# Patient Record
Sex: MALE
[De-identification: names, ages, dates, MRNs, and addresses within clinical notes are randomized per-mention and may not be internally consistent; named-entity substitution may affect disease eponyms.]

---

## 2018-04-05 NOTE — ER
Nurse's Notes                                                                                     

 [facility]                                                                

Name: James Leal Jr                                                                          

Age: 62 yrs                                                                                       

Sex: Male                                                                                         

: 1955                                                                                   

MRN: [id number]                                                                                   

Arrival Date: 2018                                                                          

Time: 03:50                                                                                       

Account#: [id number]                                                                            

Bed 20                                                                                            

Private MD: Trip Bo                                                                         

Diagnosis: Abdominal tenderness                                                                   

                                                                                                  

Presentation:                                                                                     

                                                                                             

04:13 Presenting complaint: Patient states: "I have been having lower abdominal, back , and   jd3 

      neck pains and I can't figure out what it is". Transition of care: patient was not          

      received from another setting of care. Onset of symptoms was 2018. Care prior     

      to arrival: None.                                                                           

04:13 Method Of Arrival: Ambulatory                                                           jd3 

04:13 Acuity: DUY 3                                                                           jd3 

                                                                                                  

Historical:                                                                                       

- Allergies:                                                                                      

04:19 PENICILLINS;                                                                            jd3 

- Home Meds:                                                                                      

04:19 carvedilol 12.5 mg oral tab 1 tab 2 times per day [Active]; lisinopril 20 mg Oral tab 1 jd3 

      tab once daily [Active]; simvastatin 20 mg Oral tab 1 tab once daily [Active];              

- PMHx:                                                                                           

04:19 Hypertension; High Cholesterol;                                                         jd3 

- PSHx:                                                                                           

04:19 left foot surgery;                                                                      jd3 

                                                                                                  

- Immunization history:: Adult Immunizations up to date.                                          

- Family history:: not pertinent.                                                                 

- Social history:: Smoking status: Patient uses tobacco products, smokes one pack                 

  cigarettes per day.                                                                             

- Hospitalizations: : No recent hospitalization is reported.                                      

                                                                                                  

                                                                                                  

Screenin:16 Abuse screen: Denies threats or abuse. Nutritional screening: No deficits noted.        jd3 

      Tuberculosis screening: No symptoms or risk factors identified. Fall Risk None              

      identified.                                                                                 

                                                                                                  

Assessment:                                                                                       

04:22 General: Appears in no apparent distress. comfortable, Behavior is calm, cooperative,   jd3 

      appropriate for age. Pain: Denies pain. Neuro: Level of Consciousness is awake, alert,      

      obeys commands, Oriented to person, place, time, situation. Cardiovascular: Heart tones     

      S1 S2 present Capillary refill < 3 seconds Patient's skin is warm and dry. Respiratory:     

      Airway Respiratory effort is even, unlabored, Respiratory pattern is regular,               

      symmetrical, Breath sounds are clear bilaterally. GI: Abdomen is round Bowel sounds         

      present X 4 quads. Abd is soft and non tender X 4 quads. Patient currently denies           

      nausea, vomiting. : No signs and/or symptoms were reported regarding the                  

      genitourinary system. EENT: No signs and/or symptoms were reported regarding the EENT       

      system. Derm: Skin is intact, Skin is dry, Skin is normal, Skin temperature is warm.        

      Musculoskeletal: Circulation, motion, and sensation intact. Range of motion: intact in      

      all extremities.                                                                            

04:26 Reassessment: CT notified of pt finishing oral contrast.                                jd3 

04:48 Reassessment: Patient appears in no apparent distress at this time. Patient and/or      jd3 

      family updated on plan of care and expected duration. Pain level reassessed. Patient is     

      alert, oriented x 3, equal unlabored respirations, skin warm/dry/pink.                      

05:05 Reassessment: pt reporting pain, provider notified, new orders received, see MAR.       jd3 

05:45 Reassessment: Patient appears in no apparent distress at this time. Patient and/or      jd3 

      family updated on plan of care and expected duration. Pain level reassessed. Patient is     

      alert, oriented x 3, equal unlabored respirations, skin warm/dry/pink.                      

06:00 Reassessment: pt reporting continuing pain. provider notified, new orders received see  jd3 

      MAR.                                                                                        

06:43 Reassessment: Patient appears in no apparent distress at this time. Patient and/or      jd3 

      family updated on plan of care and expected duration. Pain level reassessed. Patient is     

      alert, oriented x 3, equal unlabored respirations, skin warm/dry/pink.                      

07:05 Reassessment: Patient appears in no apparent distress at this time. Patient and/or      ch  

      family updated on plan of care and expected duration. Pain level reassessed. pt is          

      sleeping in room, resps even and unlabored, no s/s of distress. awaiting ct results.        

08:15 Reassessment: Patient appears in no apparent distress at this time. Patient and/or      ch  

      family updated on plan of care and expected duration. Pain level reassessed. Patient is     

      alert, oriented x 3, equal unlabored respirations, skin warm/dry/pink. Patient states       

      feeling better. Patient states symptoms have improved.                                      

                                                                                                  

Vital Signs:                                                                                      

04:15  / 139; Pulse 62; Resp 18 S; Temp 98.0(O); Pulse Ox 99% on R/A; Weight 100.24 kg  jd3 

      (R); Height 5 ft. 10 in. (177.80 cm) (R); Pain 0/10;                                        

04:48  / 76; Pulse 55; Resp 18 S; Pulse Ox 98% on R/A; Pain 0/10;                       jd3 

05:45  / 82; Pulse 59; Resp 17 S; Pulse Ox 96% on R/A; Pain 6/10;                       jd3 

06:43  / 75; Pulse 56; Resp 17 S; Pulse Ox 96% on R/A;                                  jd3 

08:15  / 74; Pulse 62; Resp 15; Temp 97.9; Pulse Ox 99% on R/A; Pain 6/10;              ch  

04:15 Body Mass Index 31.71 (100.24 kg, 177.80 cm)                                            jd3 

                                                                                                  

ED Course:                                                                                        

03:50 Patient arrived in ED.                                                                  am2 

03:50 Trip Bo MD is Private Physician.                                                 am2 

04:00 Gabriele Ferrer MD is Attending Physician.                                                rn  

04:12 Shun Russell RN is Primary Nurse.                                                  jd3 

04:15 Triage completed.                                                                       jd3 

04:16 Arm band placed on.                                                                     jd3 

04:17 Patient has correct armband on for positive identification.                             jd3 

06:19 CT Abd/Pelvis - W/Contrast In Process Unspecified.                                      EDMS

06:58 Report given to Darline DOMINGUEZ.                                                           jd3 

07:00 Inserted saline lock: 20 gauge in right antecubital area, using aseptic technique.      ch  

      Blood collected. inserted pta.                                                              

07:05 No apparent distress. Resting quietly.                                                  ch  

07:05 Pulse ox on. NIBP on.                                                                   ch  

07:05 No provider procedures requiring assistance completed.                                  ch  

07:10 Primary Nurse role handed off by Shun Russell RN                                   ch  

07:10 Darline Ly, RN is Primary Nurse.                                                ch  

07:52 Trip Bo MD is Referral Physician.                                                gwen 

08:15 IV discontinued, intact, bleeding controlled, No redness/swelling at site. Pressure     ch  

      dressing applied.                                                                           

                                                                                                  

Administered Medications:                                                                         

05:07 Drug: Demerol - Meperidine 12.5 mg Route: IVP; Site: right antecubital;                 jd3 

06:03 Follow up: Response: No adverse reaction; Pain is unchanged, physician notified         jd3 

06:02 Drug: Demerol - Meperidine 12.5 mg Route: IVP; Site: right antecubital;                 jd3 

07:09 Follow up: Response: No adverse reaction; Marked relief of symptoms                       

                                                                                                  

                                                                                                  

Outcome:                                                                                          

07:00 Discharged to home ambulatory, with family.                                               

07:00 Condition: stable                                                                           

07:00 Discharge instructions given to patient, family, Instructed on discharge instructions,      

      follow up and referral plans. medication usage, Demonstrated understanding of               

      instructions, follow-up care, medications, Prescriptions given X 3.                         

07:54 Discharge ordered by MD. hidalgo 

08:19 Patient left the ED.                                                                      

                                                                                                  

Signatures:                                                                                       

Dispatcher [employer]                           EDDarline Villalpando, RN                  Garo Quinteros ch, MD MD cha Nieto, Roman, MD MD rn Moreno, Amanda am2 Davies, Jonathon, RN                    RN   jd3                                                  

                                                                                                  

Corrections: (The following items were deleted from the chart)                                    

06:05 05:45  / 82; Pulse 59bpm; Resp 17bpm; Spontaneous; Pulse Ox 96% RA; jd3           jd3 

06:59 06:58 Report given to Darline DOMINGUEZ jd3                                                  jd3 

                                                                                                  

**************************************************************************************************

## 2018-04-05 NOTE — RAD REPORT
EXAM DESCRIPTION:  CT - Abdomen   Pelvis W Contrast - 4/5/2018 6:19 am

 

CLINICAL HISTORY:   Abdominal pain. Lower abdominal pain

 

COMPARISON:  None.

 

TECHNIQUE:  Computed axial tomography of the abdomen and pelvis was obtained. 100 cc Isovue-300 is ad
ministered intravenously. Oral contrast was given. A preliminary report was generated by [facility] and reviewed prior to this dictation

 

All CT scans are performed using dose optimization technique as appropriate and may include automated
 exposure control or mA/KV adjustment according to patient size.

 

FINDINGS:

The liver, spleen, pancreas, adrenals and kidneys appear unremarkable.

 

The appendix is normal caliber. There is no evidence of diverticulitis.

 

Small inguinal hernias contain fat

 

 

IMPRESSION:   No acute abnormality is displayed

## 2018-04-05 NOTE — EDPHYS
Physician Documentation                                                                           

 [facility]                                                                

Name: James Leal Jr                                                                          

Age: 62 yrs                                                                                       

Sex: Male                                                                                         

: 1955                                                                                   

MRN: [id number]                                                                                   

Arrival Date: 2018                                                                          

Time: 03:50                                                                                       

Account#: [id number]                                                                            

Bed 20                                                                                            

Private MD: Trip Bo                                                                         

ED Physician Gabriele Ferrer                                                                         

HPI:                                                                                              

                                                                                             

04:13 This 62 yrs old  Male presents to ER via Unassigned with complaints of          rn  

      Abdominal Pain.                                                                             

04:14 The patient presents with abdominal pain in the lower abdomen. Onset: The               rn  

      symptoms/episode began/occurred yesterday. The symptoms do not radiate. Associated          

      signs and symptoms: Pertinent positives: constipation, Pertinent negatives: anorexia,       

      blood in stools, diarrhea, dysuria, fever, shortness of breath, testicular pain,            

      vomiting, vomiting blood. The symptoms are described as achy, crampy. Modifying             

      factors: The symptoms are alleviated by nothing, the symptoms are aggravated by             

      nothing. Severity of pain: At its worst the pain was mild in the emergency department       

      the pain is unchanged. The patient has not experienced similar symptoms in the past.        

      Reports lower abd pain that began last night, intermittent, tried to go to bathroom and     

      couldn't, no fever, no trauma, never happened before, started shortly after taking          

      zquil.                                                                                      

                                                                                                  

Historical:                                                                                       

- Allergies:                                                                                      

04:19 PENICILLINS;                                                                            jd3 

- Home Meds:                                                                                      

04:19 carvedilol 12.5 mg oral tab 1 tab 2 times per day [Active]; lisinopril 20 mg Oral tab 1 jd3 

      tab once daily [Active]; simvastatin 20 mg Oral tab 1 tab once daily [Active];              

- PMHx:                                                                                           

04:19 Hypertension; High Cholesterol;                                                         jd3 

- PSHx:                                                                                           

04:19 left foot surgery;                                                                      jd3 

                                                                                                  

- Immunization history:: Adult Immunizations up to date.                                          

- Family history:: not pertinent.                                                                 

- Social history:: Smoking status: Patient uses tobacco products, smokes one pack                 

  cigarettes per day.                                                                             

- Hospitalizations: : No recent hospitalization is reported.                                      

                                                                                                  

                                                                                                  

ROS:                                                                                              

04:14 Constitutional: Negative for fever, chills, and weight loss, Eyes: Negative for injury, rn  

      pain, redness, and discharge, Cardiovascular: Negative for chest pain, palpitations,        

      and edema, Respiratory: Negative for shortness of breath, cough, wheezing, and              

      pleuritic chest pain, Abdomen/GI: Negative for nausea, vomiting, diarrhea Back:             

      Negative for injury and pain, MS/Extremity: Negative for injury and deformity, Skin:        

      Negative for injury, rash, and discoloration, Neuro: Negative for headache, weakness,       

      numbness, tingling, and seizure.                                                            

                                                                                                  

Exam:                                                                                             

04:14 Constitutional:  This is a well developed, well nourished patient who is awake, alert,  rn  

      and in no acute distress. Head/Face:  Normocephalic, atraumatic. Eyes:  Pupils equal        

      round and reactive to light, extra-ocular motions intact.  Lids and lashes normal.          

      Conjunctiva and sclera are non-icteric and not injected.  Cornea within normal limits.      

      Periorbital areas with no swelling, redness, or edema. Neck:  Trachea midline, no           

      thyromegaly or masses palpated, and no cervical lymphadenopathy.  Supple, full range of     

      motion without nuchal rigidity, or vertebral point tenderness.  No Meningismus.             

      Cardiovascular:  Regular rate and rhythm with a normal S1 and S2.  No gallops, murmurs,     

      or rubs.  Normal PMI, no JVD.  No pulse deficits. Respiratory:  Lungs have equal breath     

      sounds bilaterally, clear to auscultation and percussion.  No rales, rhonchi or wheezes     

      noted.  No increased work of breathing, no retractions or nasal flaring. Abdomen/GI:        

      Soft, non-tender, with normal bowel sounds.  No distension or tympany.  No guarding or      

      rebound.  No evidence of tenderness throughout. MS/ Extremity:  Pulses equal, no            

      cyanosis.  Neurovascular intact.  Full, normal range of motion.  Equal circumference.       

      Neuro:  Awake and alert, GCS 15, oriented to person, place, time, and situation.            

      Cranial nerves II-XII grossly intact.  Motor strength 5/5 in all extremities.  Sensory      

      grossly intact.  Cerebellar exam normal.  Normal gait.                                      

                                                                                                  

Vital Signs:                                                                                      

04:15  / 139; Pulse 62; Resp 18 S; Temp 98.0(O); Pulse Ox 99% on R/A; Weight 100.24 kg  jd3 

      (R); Height 5 ft. 10 in. (177.80 cm) (R); Pain 0/10;                                        

04:48  / 76; Pulse 55; Resp 18 S; Pulse Ox 98% on R/A; Pain 0/10;                       jd3 

05:45  / 82; Pulse 59; Resp 17 S; Pulse Ox 96% on R/A; Pain 6/10;                       jd3 

06:43  / 75; Pulse 56; Resp 17 S; Pulse Ox 96% on R/A;                                  jd3 

08:15  / 74; Pulse 62; Resp 15; Temp 97.9; Pulse Ox 99% on R/A; Pain 6/10;              ch  

04:15 Body Mass Index 31.71 (100.24 kg, 177.80 cm)                                            jd3 

                                                                                                  

MDM:                                                                                              

04:00 Patient medically screened.                                                             rn  

07:57 Data reviewed: vital signs, nurses notes, lab test result(s), EKG, radiologic studies,  [facility] 

      CT scan, plain films.                                                                       

                                                                                                  

                                                                                             

04:13 Order name: Basic Metabolic Panel; Complete Time: 06:12                                 rn  

                                                                                             

04:13 Order name: CBC with Diff; Complete Time: 04:49                                         rn  

                                                                                             

04:13 Order name: Creatinine for Radiology; Complete Time: 06:12                              rn  

                                                                                             

04:13 Order name: Hepatic Function; Complete Time: 06:12                                      rn  

                                                                                             

04:13 Order name: Lipase; Complete Time: 06:12                                                rn  

                                                                                             

04:13 Order name: Urine Microscopic Only; Complete Time: 06:12                                rn  

                                                                                             

04:13 Order name: IV Saline Lock; Complete Time: 04:24                                        rn  

                                                                                             

04:13 Order name: CT Abd/Pelvis - W/Contrast                                                  rn  

                                                                                             

04:13 Order name: Flu; Complete Time: 06:12                                                   rn  

                                                                                             

04:13 Order name: CK; Complete Time: 06:12                                                    rn  

                                                                                             

05:08 Order name: Urine Dipstick--Ancillary (enter results); Complete Time: 06:12             rg2 

                                                                                             

04:13 Order name: Labs collected and sent; Complete Time: 04:24                               rn  

                                                                                             

04:13 Order name: Urine Dipstick-Ancillary (obtain specimen); Complete Time: 05:07            rn  

                                                                                             

07:57 Order name: EKG - Nurse/Tech; Complete Time: 08:05                                      [facility] 

                                                                                                  

Administered Medications:                                                                         

05:07 Drug: Demerol - Meperidine 12.5 mg Route: IVP; Site: right antecubital;                 jd3 

06:03 Follow up: Response: No adverse reaction; Pain is unchanged, physician notified         jd3 

06:02 Drug: Demerol - Meperidine 12.5 mg Route: IVP; Site: right antecubital;                 jd3 

07:09 Follow up: Response: No adverse reaction; Marked relief of symptoms                       

                                                                                                  

                                                                                                  

Disposition:                                                                                      

18 07:54 Discharged to Home. Impression: Abdominal tenderness.                              

- Condition is Stable.                                                                            

- Discharge Instructions: Abdominal Pain, Adult, Abdominal Pain, Adult, Easy-to-Read.             

- Prescriptions for Bentyl 20 mg Oral Tablet - take 1 tablet by ORAL route every 6                

  hours As needed; 20 tablet. Pepcid 20 mg Oral Tablet - take 1 tablet by ORAL route              

  every 12 hours for 10 days; 20 tablet. Zofran 4 mg Oral Tablet - take 1 tablet by               

  ORAL route every 12 hours As needed; 20 tablet.                                                 

- Work release form, Family Work Release, Medication Reconciliation Form, Thank You               

  Letter, Antibiotic Education, Prescription Opioid Use form.                                     

- Follow up: Trip Bo MD; When: 1 - 2 days; Reason: Recheck today's complaints,             

  Continuance of care, Re-evaluation by your physician.                                           

- Problem is new.                                                                                 

- Symptoms have improved.                                                                         

                                                                                                  

                                                                                                  

                                                                                                  

Signatures:                                                                                       

Dispatcher [employer]                           Darline Vera, RN                  RN   Garo Leavitt MD MD cha Nieto, Roman, MD MD rn Davies, Jonathon, RN                    RN   jd3                                                  

                                                                                                  

**************************************************************************************************

## 2019-01-07 ENCOUNTER — HOSPITAL ENCOUNTER (EMERGENCY)
Dept: HOSPITAL 97 - ER | Age: 64
Discharge: LEFT BEFORE DISCHARGE | End: 2019-01-07
Payer: COMMERCIAL

## 2019-01-07 VITALS — DIASTOLIC BLOOD PRESSURE: 68 MMHG | TEMPERATURE: 97.3 F | SYSTOLIC BLOOD PRESSURE: 167 MMHG | OXYGEN SATURATION: 98 %

## 2019-01-07 DIAGNOSIS — Z53.21: ICD-10-CM

## 2019-01-07 DIAGNOSIS — F17.210: ICD-10-CM

## 2019-01-07 DIAGNOSIS — M25.561: Primary | ICD-10-CM
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
## 2019-01-07 PROCEDURE — 99281 EMR DPT VST MAYX REQ PHY/QHP: CPT

## 2019-01-07 NOTE — ER
Nurse's Notes                                                                                     

 [facility]                                                                

Name: James Leal Jr                                                                          

Age: 63 yrs                                                                                       

Sex: Male                                                                                         

: 1955                                                                                   

MRN: [id number]                                                                                   

Arrival Date: 2019                                                                          

Time: 11:32                                                                                       

Account#: [id number]                                                                            

Bed 12                                                                                            

Private MD: Trip Bo                                                                         

Diagnosis:                                                                                        

                                                                                                  

Presentation:                                                                                     

                                                                                             

12:10 Presenting complaint: Patient states: "I slipped and fell on Saturday and hurt my right aa5 

      knee". Pt c/o right knee pain and swelling. Transition of care: patient was not             

      received from another setting of care. Onset of symptoms was 2019. Risk             

      Assessment: Do you want to hurt yourself or someone else? Patient reports no desire to      

      harm self or others. Initial Sepsis Screen: Does the patient meet any 2 criteria? No.       

      Patient's initial sepsis screen is negative. Does the patient have a suspected source       

      of infection? No. Patient's initial sepsis screen is negative. Care prior to arrival:       

      None.                                                                                       

12:10 Method Of Arrival: Wheelchair                                                           aa5 

12:10 Acuity: DUY 4                                                                           aa5 

                                                                                                  

Historical:                                                                                       

- Allergies:                                                                                      

12:09 PENICILLINS;                                                                            aa5 

- PMHx:                                                                                           

12:09 High Cholesterol; Hypertension;                                                         aa5 

- PSHx:                                                                                           

12:09 left foot surgery;                                                                      aa5 

                                                                                                  

- Immunization history:: Adult Immunizations unknown.                                             

- Social history:: Smoking status: Patient uses tobacco products, smokes one pack                 

  cigarettes per day.                                                                             

- Ebola Screening: : No symptoms or risks identified at this time.                                

                                                                                                  

                                                                                                  

Vital Signs:                                                                                      

12:11  / 68; Pulse 72; Resp 18 S; Temp 97.3(TE); Pulse Ox 98% on R/A; Weight 98.88 kg   aa5 

      (R); Height 5 ft. 10 in. (177.80 cm) (R); Pain 10/10;                                       

12:11 Body Mass Index 31.28 (98.88 kg, 177.80 cm)                                             aa5 

                                                                                                  

ED Course:                                                                                        

11:32 Patient arrived in ED.                                                                  sb2 

11:33 Trip Bo MD is Private Physician.                                                 sb2 

12:09 Arm band placed on.                                                                     aa5 

12:11 Triage completed.                                                                       aa5 

13:51 Gabriele Ferrer MD is Attending Physician.                                                aa5 

                                                                                                  

Administered Medications:                                                                         

No medications were administered                                                                  

                                                                                                  

                                                                                                  

Outcome:                                                                                          

13:51 Patient left the ED.                                                                    aa5 

                                                                                                  

Signatures:                                                                                       

Deras, Aylin, RN                     RN   aa5                                                  

Kera Martinez                               sb2                                                  

                                                                                                  

Corrections: (The following items were deleted from the chart)                                    

12:12 12:11 98.88 kg Reported; Height 5 ft. 10 in. Reported; BMI: 31.2; Pain 10/10; aa5       aa5 

                                                                                                  

**************************************************************************************************

## 2019-11-20 ENCOUNTER — HOSPITAL ENCOUNTER (EMERGENCY)
Dept: HOSPITAL 97 - ER | Age: 64
LOS: 1 days | Discharge: HOME | End: 2019-11-21
Payer: COMMERCIAL

## 2019-11-20 DIAGNOSIS — F17.210: ICD-10-CM

## 2019-11-20 DIAGNOSIS — Z88.0: ICD-10-CM

## 2019-11-20 DIAGNOSIS — I10: ICD-10-CM

## 2019-11-20 DIAGNOSIS — R20.2: Primary | ICD-10-CM

## 2019-11-20 DIAGNOSIS — E78.00: ICD-10-CM

## 2019-11-20 LAB
BUN BLD-MCNC: 16 MG/DL (ref 7–18)
GLUCOSE SERPLBLD-MCNC: 93 MG/DL (ref 74–106)
HCT VFR BLD CALC: 48.8 % (ref 39.6–49)
INR BLD: 1.1
LYMPHOCYTES # SPEC AUTO: 3.2 K/UL (ref 0.7–4.9)
PMV BLD: 9 FL (ref 7.6–11.3)
POTASSIUM SERPL-SCNC: 3.5 MMOL/L (ref 3.5–5.1)
RBC # BLD: 5.25 M/UL (ref 4.33–5.43)

## 2019-11-20 PROCEDURE — 85025 COMPLETE CBC W/AUTO DIFF WBC: CPT

## 2019-11-20 PROCEDURE — 85730 THROMBOPLASTIN TIME PARTIAL: CPT

## 2019-11-20 PROCEDURE — 99283 EMERGENCY DEPT VISIT LOW MDM: CPT

## 2019-11-20 PROCEDURE — 93005 ELECTROCARDIOGRAM TRACING: CPT

## 2019-11-20 PROCEDURE — 82947 ASSAY GLUCOSE BLOOD QUANT: CPT

## 2019-11-20 PROCEDURE — 80048 BASIC METABOLIC PNL TOTAL CA: CPT

## 2019-11-20 PROCEDURE — 85610 PROTHROMBIN TIME: CPT

## 2019-11-20 PROCEDURE — 36415 COLL VENOUS BLD VENIPUNCTURE: CPT

## 2019-11-20 PROCEDURE — 70450 CT HEAD/BRAIN W/O DYE: CPT

## 2019-11-20 NOTE — RAD REPORT
EXAM DESCRIPTION:  CT - Head Brain Wo Cont - 11/20/2019 7:31 pm

 

CLINICAL HISTORY:  Headache

 

COMPARISON:  2014

 

TECHNIQUE:  Computed axial tomography of the head was obtained. IV contrast was not requested.

 

All CT scans are performed using dose optimization technique as appropriate and may include automated
 exposure control or mA/KV adjustment according to patient size.

 

FINDINGS:  An intracranial  bleed is not seen .

 

The ventricles are normal in caliber.

 

No extra-axial fluid collection is noted. Small parity falcine lipoma. Small lipoma in the region of 
the tentorium.

 

Fluid within the sinuses/ mastoids is not seen.

 

IMPRESSION:  No acute intracranial abnormality is seen. If patient's symptoms persist  MRI of the bra
in would be recommended.

## 2019-11-20 NOTE — ER
Nurse's Notes                                                                                     

 [facility]                                                                 

Name: James Leal Jr                                                                          

Age: 64 yrs                                                                                       

Sex: Male                                                                                         

: 1955                                                                                   

MRN: [id number]                                                                                   

Arrival Date: 2019                                                                          

Time: 19:07                                                                                       

Account#: [id number]                                                                            

Bed 13                                                                                            

Private MD: Trip Bo                                                                         

Diagnosis: Parasthesias                                                                           

                                                                                                  

Presentation:                                                                                     

                                                                                             

19:08 Presenting complaint: Patient states: Numbness to left side of face and left arm that   aj1 

      started one hour and lasted approximately 15 minutes before resolving. Patient states       

      that he had a similar episode yesterday. Denies any numbness at this time. Hand        

      are equal, patient walked to triage with a steady gait. Equal smile. Patient reports        

      headache. Transition of care: patient was not received from another setting of care.        

      Onset of symptoms was 2019. Risk Assessment: Do you want to hurt yourself      

      or someone else? Patient reports no desire to harm self or others. Initial Sepsis           

      Screen: Does the patient meet any 2 criteria? No. Patient's initial sepsis screen is        

      negative. Does the patient have a suspected source of infection? No. Patient's initial      

      sepsis screen is negative. Care prior to arrival: None.                                     

19:08 Method Of Arrival: Ambulatory                                                           aj1 

19:08 Acuity: DUY 3                                                                           aj1 

                                                                                                  

Triage Assessment:                                                                                

19:10 General: Appears in no apparent distress. comfortable, Behavior is calm, cooperative,   aj1 

      appropriate for age. Pain: Complains of pain in right temple and left temple Pain           

      currently is 6 out of 10 on a pain scale. Neuro: Level of Consciousness is awake,           

      alert, obeys commands, Oriented to person, place, time, situation,  are equal          

      bilaterally Moves all extremities. Full function Gait is steady, Speech is normal,          

      Facial symmetry appears normal, Reports headache. Cardiovascular: Patient's skin is         

      warm and dry. Respiratory: Airway is patent Respiratory effort is even, unlabored,          

      Respiratory pattern is regular, symmetrical.                                                

                                                                                                  

Historical:                                                                                       

- Allergies:                                                                                      

19:10 PENICILLINS;                                                                            aj1 

- Home Meds:                                                                                      

19:10 carvedilol 12.5 mg Oral tab 1 tab 2 times per day [Active]; lisinopril 20 mg Oral tab 1 aj1 

      tab once daily [Active]; simvastatin 20 mg Oral tab 1 tab once daily [Active];              

- PMHx:                                                                                           

19:10 High Cholesterol; Hypertension;                                                         aj1 

                                                                                                  

- Immunization history:: Flu vaccine status is unknown.                                           

- Social history:: Smoking status: Patient uses tobacco products, smokes one-half pack            

  cigarettes per day.                                                                             

- Ebola Screening: : Patient denies travel to an Ebola-affected area in the 21 days               

  before illness onset.                                                                           

                                                                                                  

                                                                                                  

Screenin:19 Abuse screen: Denies threats or abuse. Denies injuries from another. Nutritional        lp1 

      screening: No deficits noted. Tuberculosis screening: No symptoms or risk factors           

      identified. Fall Risk None identified.                                                      

21:35 The patient has not been NPO before screening. Patient noted to have drink at bedside   lp1 

      The patient is alert, able to follow commands. The patient does not exhibit slurred or      

      garbled speech The patient is not exhibiting difficulty speaking. The patient does not      

      exhibit difficulty understanding words. The patient is able to swallow own secretions       

      with no drooling or need for suction. Patient tolerated one teaspoon of water. No           

      drooling, immediate coughing, gurgling, or clearing of the throat was noted. The            

      patient tolerated 90mL of water. No drooling, immediate coughing, gurgling, or clearing     

      of the throat was noted. The patient passed the bedside swallow screening. Oral             

      medications may be given as ordered. Contact Physician for further diet orders.             

      Provider notified of bedside swallow screening results: Tyron Ureña MD.                 

                                                                                                  

Assessment:                                                                                       

21:18 Reassessment: Patient states symptoms of tingling to left arm and left side of lip      lp1 

      resolved at this time. General: Appears in no apparent distress. Behavior is calm,          

      cooperative, appropriate for age. Pain: Denies pain. Neuro: Level of Consciousness is       

      awake, alert, obeys commands, Oriented to person, place, time, situation, Moves all         

      extremities. Full function Gait is steady, Speech is normal, Pupils are PERRLA, Intact.     

      Cardiovascular: Patient's skin is warm and dry. Respiratory: Respiratory effort is          

      even, unlabored. GI: No signs and/or symptoms were reported involving the                   

      gastrointestinal system. : No signs and/or symptoms were reported regarding the           

      genitourinary system. EENT: No signs and/or symptoms were reported regarding the EENT       

      system. Derm: Skin is pink, warm \T\ dry. Musculoskeletal: No deficits noted.               

21:35 Reassessment: Per Dr. Ureña, patient okay to eat at this time; sandwich given.         lp1 

22:51 Reassessment: Patient appears in no apparent distress at this time. No changes from     fu  

      previously documented assessment. Patient is alert, oriented x 3, equal unlabored           

      respirations, skin warm/dry/pink. Patient states feeling better.                            

                                                                                                  

Vital Signs:                                                                                      

19:10  / 71; Pulse 65; Resp 16; Temp 97.2; Pulse Ox 100% on R/A; Weight 98.88 kg (R);   aj1 

      Height 5 ft. 10 in. (177.80 cm) (R);                                                        

21:18  / 67; Pulse 64; Resp 18; Pulse Ox 98% on R/A; Pain 0/10;                         lp1 

22:52  / 63; Pulse 61; Pulse Ox 95% ; Pain 0/10;                                        fu  

19:10 Body Mass Index 31.28 (98.88 kg, 177.80 cm)                                             aj 

                                                                                                  

ED Course:                                                                                        

19:07 Patient arrived in ED.                                                                  as  

19:07 Trip Bo MD is Private Physician.                                                 as  

19:10 Triage completed.                                                                       aj1 

19:10 Arm band placed on Patient placed in waiting room, Patient notified of wait time.       aj1 

19:30 CT Head Brain wo Cont In Process Unspecified.                                           EDMS

20:50 Tyron Ureña MD is Attending Physician.                                            tw4 

21:18 Katerine Alejandra, RN is Primary Nurse.                                                       lp1 

21:20 Patient has correct armband on for positive identification.                             lp1 

21:45 Inserted saline lock: 20 gauge in right antecubital area, using aseptic technique.      oe  

      Blood collected.                                                                            

22:30 No provider procedures requiring assistance completed.                                  lp1 

23:30 No apparent distress. resting in bed.                                                   fu  

23:47 Trip Bo MD is Referral Physician.                                                tw4 

23:51 Kale Sylvester MD is Referral Physician.                                             tw4 

                                                                                                  

Administered Medications:                                                                         

No medications were administered                                                                  

                                                                                                  

                                                                                                  

Outcome:                                                                                          

23:50 Discharge ordered by MD.                                                                tw4 

                                                                                             

00:35 Patient left the ED.                                                                    fu  

                                                                                                  

Signatures:                                                                                       

Dispatcher [employer]                           EDMS                                                 

Davina Rosenthal, RN                     RN   aj1                                                  

Bhakti Gibson                             as                                                   

Katerine Alejandra, ALBERTO                         RN   lp1                                                  

Paulino Foley Felix, RN RN                                                      

Tyron Ureña MD MD   tw4                                                  

                                                                                                  

Corrections: (The following items were deleted from the chart)                                    

                                                                                             

22:34 21:30 Reassessment: Per Dr. Ureña, patient okay to eat at this time; sandwich given [id number] 

                                                                                                  

**************************************************************************************************

## 2019-11-20 NOTE — EDPHYS
Physician Documentation                                                                           

 [facility]                                                                 

Name: James Leal Jr                                                                          

Age: 64 yrs                                                                                       

Sex: Male                                                                                         

: 1955                                                                                   

MRN: [id number]                                                                                   

Arrival Date: 2019                                                                          

Time: 19:07                                                                                       

Account#: [id number]                                                                            

Bed 13                                                                                            

Private MD: Trip Bo ED Physician Tyron Ureña                                                                     

HPI:                                                                                              

                                                                                             

04:21 This 64 yrs old  Male presents to ER via Ambulatory with complaints of Numbness tw4 

      Of Face, Numbness Of Arm.                                                                   

04:21 The patient's problem is reported as paresthesias, in left upper extremity, in left     tw4 

      side of face. Onset: The symptoms/episode began/occurred today. Duration: lasting a few     

      minutes, the last episode was 2 hour(s) ago.                                                

04:23 Context: symptoms became apparent occurred at home. The symptoms are alleviated by      tw4 

      nothing. The symptoms are aggravated by nothing. Associated signs and symptoms: The         

      patient has no apparent associated signs or symptoms. Pt had numbness on the corner of      

      his mouth on the left side and numbness to his hand only. pt denied weakness of upper,      

      lower extremity. Pt denied speech changes.                                                  

                                                                                                  

Historical:                                                                                       

- Allergies:                                                                                      

                                                                                             

19:10 PENICILLINS;                                                                            aj1 

- Home Meds:                                                                                      

19:10 carvedilol 12.5 mg Oral tab 1 tab 2 times per day [Active]; lisinopril 20 mg Oral tab 1 aj1 

      tab once daily [Active]; simvastatin 20 mg Oral tab 1 tab once daily [Active];              

- PMHx:                                                                                           

19:10 High Cholesterol; Hypertension;                                                         aj1 

                                                                                                  

- Immunization history:: Flu vaccine status is unknown.                                           

- Social history:: Smoking status: Patient uses tobacco products, smokes one-half pack            

  cigarettes per day.                                                                             

- Ebola Screening: : Patient denies travel to an Ebola-affected area in the 21 days               

  before illness onset.                                                                           

                                                                                                  

                                                                                                  

ROS:                                                                                              

                                                                                             

04:31 Constitutional: Negative for fever, chills, and weight loss, Eyes: Negative for injury, tw4 

      pain, redness, and discharge, Cardiovascular: Negative for chest pain, palpitations,        

      and edema, Respiratory: Negative for shortness of breath, cough, wheezing, and              

      pleuritic chest pain, Abdomen/GI: Negative for abdominal pain, nausea, vomiting,            

      diarrhea, and constipation, Back: Negative for injury and pain, MS/Extremity: Negative      

      for injury and deformity, Skin: Negative for injury, rash, and discoloration.               

      Neuro: Positive for numbness, Negative for altered mental status, dizziness, gait           

      disturbance, headache, hearing loss, loss of consciousness, seizure activity, speech        

      changes, syncope, near syncope, tingling, tinnitus, tremor, visual changes, weakness.       

                                                                                                  

Exam:                                                                                             

04:31 Radiologist reports: No acute changes                                                   tw4 

04:31 Constitutional:  This is a well developed, well nourished patient who is awake, alert,      

      and in no acute distress. Head/Face:  Normocephalic, atraumatic. Eyes:  Pupils equal        

      round and reactive to light, extra-ocular motions intact.  Lids and lashes normal.          

      Conjunctiva and sclera are non-icteric and not injected.  Cornea within normal limits.      

      Periorbital areas with no swelling, redness, or edema. ENT:  Nares patent. No nasal         

      discharge, no septal abnormalities noted.  Tympanic membranes are normal and external       

      auditory canals are clear.  Oropharynx with no redness, swelling, or masses, exudates,      

      or evidence of obstruction, uvula midline.  Mucous membranes moist. Neck:  Trachea          

      midline, no thyromegaly or masses palpated, and no cervical lymphadenopathy.  Supple,       

      full range of motion without nuchal rigidity, or vertebral point tenderness.  No            

      Meningismus. Chest/axilla:  Normal chest wall appearance and motion.  Nontender with no     

      deformity.  No lesions are appreciated. Cardiovascular:  Regular rate and rhythm with a     

      normal S1 and S2.  No gallops, murmurs, or rubs.  Normal PMI, no JVD.  No pulse             

      deficits. Respiratory:  Lungs have equal breath sounds bilaterally, clear to                

      auscultation and percussion.  No rales, rhonchi or wheezes noted.  No increased work of     

      breathing, no retractions or nasal flaring. Abdomen/GI:  Soft, non-tender, with normal      

      bowel sounds.  No distension or tympany.  No guarding or rebound.  No evidence of           

      tenderness throughout. Skin:  Warm, dry with normal turgor.  Normal color with no           

      rashes, no lesions, and no evidence of cellulitis. MS/ Extremity:  Pulses equal, no         

      cyanosis.  Neurovascular intact.  Full, normal range of motion. Neuro:  Awake and           

      alert, GCS 15, oriented to person, place, time, and situation.  Cranial nerves II-XII       

      grossly intact.  Motor strength 5/5 in all extremities.  Sensory grossly intact.            

      Cerebellar exam normal.  Normal gait.                                                       

                                                                                                  

Vital Signs:                                                                                      

                                                                                             

19:10  / 71; Pulse 65; Resp 16; Temp 97.2; Pulse Ox 100% on R/A; Weight 98.88 kg (R);   aj1 

      Height 5 ft. 10 in. (177.80 cm) (R);                                                        

21:18  / 67; Pulse 64; Resp 18; Pulse Ox 98% on R/A; Pain 0/10;                         lp1 

22:52  / 63; Pulse 61; Pulse Ox 95% ; Pain 0/10;                                        fu  

19:10 Body Mass Index 31.28 (98.88 kg, 177.80 cm)                                             [facility] 

                                                                                                  

MDM:                                                                                              

20:50 Patient medically screened.                                                             tw                                                                                             

04:31 Differential diagnosis: CVA, TIA. Data reviewed: vital signs, nurses notes. Counseling: tw4 

      I had a detailed discussion with the patient and/or guardian regarding: the historical      

      points, exam findings, and any diagnostic results supporting the discharge/admit            

      diagnosis, lab results, radiology results. Special discussion: I discussed with the         

      patient/guardian in detail that at this point there is no indication for admission to       

      the hospital. It is understood, however, that if the symptoms persist or worsen the         

      patient needs to return immediately for re-evaluation. ED course: Symptoms did not seem     

      to be typical for CVA or TIA but appeared to be more focal in nature. Will have pt          

      followup with PCP for MRI.                                                                  

                                                                                                  

                                                                                             

21:24 Order name: Basic Metabolic Panel; Complete Time: 23:42                                 [facility] 

                                                                                             

23:43 Interpretation: Normal except: GFR 80.                                                  tw                                                                                             

21:24 Order name: CBC with Diff; Complete Time: 23:42                                         tw4 

                                                                                             

23:43 Interpretation: Normal except: WBC 11.9.                                                tw4 

                                                                                             

19:13 Order name: CT Head Brain wo Cont; Complete Time: 23:42                                 [facility] 

                                                                                             

23:43 Interpretation: No acute disease.                                                       tw                                                                                             

21:24 Order name: Protime (+inr); Complete Time: 23:42                                        [facility] 

                                                                                             

23:43 Interpretation: Normal except: PT 12.9.                                                 tw                                                                                             

21:24 Order name: Ptt, Activated; Complete Time: 23:42                                        [facility] 

                                                                                             

23:43 Interpretation: Within normal limits: PTT 34.4.                                         tw4 

                                                                                             

22:00 Order name: Glucose, Ancillary Testing; Complete Time: 23:42                            [facility]

                                                                                             

21:24 Order name: EKG; Complete Time: 21:25                                                   tw4 

                                                                                             

21:24 Order name: Accucheck; Complete Time: 21:50                                             tw4 

                                                                                             

21:24 Order name: Cardiac monitoring; Complete Time: 21:38                                    tw4 

                                                                                             

21:24 Order name: EKG - Nurse/Tech; Complete Time: 21:50                                      tw4 

                                                                                             

21:24 Order name: IV Saline Lock; Complete Time: 21:50                                        tw4 

                                                                                             

21:24 Order name: Labs collected and sent; Complete Time: 21:50                               tw4 

                                                                                             

21:24 Order name: NPO; Complete Time: 21:38                                                   tw4 

                                                                                             

21:24 Order name: O2 Per Protocol; Complete Time: 21:38                                       tw4 

                                                                                             

21:24 Order name: O2 Sat Monitoring; Complete Time: 21:38                                     tw4 

                                                                                             

21:24 Order name: Stroke Swallow Screen; Complete Time: 21:38                                 tw4 

                                                                                                  

EC:49 Rate is 62 beats/min. Rhythm is regular. QRS Axis is Normal. SD interval is normal. QRS tw4 

      interval is normal. QT interval is normal. No Q waves. T waves are Normal. No ST            

      changes noted. Clinical impression: Normal ECG. Interpreted by me. Reviewed by me.          

                                                                                                  

Administered Medications:                                                                         

No medications were administered                                                                  

                                                                                                  

                                                                                                  

Disposition:                                                                                      

04:36 Chart complete.                                                                         tw4 

                                                                                                  

Disposition:                                                                                      

19 23:50 Discharged to Home. Impression: Parasthesias.                                      

- Condition is Stable.                                                                            

- Discharge Instructions: Peripheral Neuropathy, Focal Neuropathy.                                

                                                                                                  

- Work release form, Medication Reconciliation Form, Thank You Letter, Antibiotic                 

  Education, Prescription Opioid Use form.                                                        

- Follow up: Trip Bo MD; When: Upon discharge from the Emergency Department;               

  Reason: Recheck today's complaints, Continuance of care. Follow up: Kale Sylvester MD; When: Upon discharge from the Emergency Department; Reason: Recheck today's                 

  complaints, Continuance of care.                                                                

- Problem is new.                                                                                 

- Symptoms have improved.                                                                         

                                                                                                  

                                                                                                  

                                                                                                  

Signatures:                                                                                       

Dispatcher [employer]                           EDMS                                                 

Davina Rosenthal RN RN   aj1                                                  

Dada Hammond RN RN fu Wadley, Terrence, MD MD   tw4                                                  

                                                                                                  

Corrections: (The following items were deleted from the chart)                                    

                                                                                             

23:51 23:50 2019 23:50 Discharged to Home. Impression: Parasthesias. Condition is       tw4 

      Stable. Forms are Medication Reconciliation Form, Thank You Letter, Antibiotic              

      Education, Prescription Opioid Use. Follow up: Trip Bo; When: Upon discharge from      

      the Emergency Department; Reason: Recheck today's complaints, Continuance of care.          

      Problem is new. Symptoms have improved. tw4                                                 

                                                                                             

00:35  23:51 2019 23:50 Discharged to Home. Impression: Parasthesias. Condition is fu  

      Stable. Discharge Instructions: Peripheral Neuropathy, Focal Neuropathy. Forms are          

      Medication Reconciliation Form, Thank You Letter, Antibiotic Education, Prescription        

      Opioid Use. Follow up: Trip Bo; When: Upon discharge from the Emergency                

      Department; Reason: Recheck today's complaints, Continuance of care. Follow up: Kale Sylvester; When: Upon discharge from the Emergency Department; Reason: Recheck today's       

      complaints, Continuance of care. Problem is new. Symptoms have improved. tw4                

                                                                                                  

**************************************************************************************************

## 2019-11-21 VITALS — TEMPERATURE: 97.2 F

## 2019-11-21 VITALS — DIASTOLIC BLOOD PRESSURE: 63 MMHG | SYSTOLIC BLOOD PRESSURE: 131 MMHG | OXYGEN SATURATION: 95 %

## 2019-11-21 NOTE — EKG
Test Date:    2019-11-20               Test Time:    21:49:51

Technician:   PATY                                     

                                                     

MEASUREMENT RESULTS:                                       

Intervals:                                           

Rate:         62                                     

WV:           186                                    

QRSD:         98                                     

QT:           400                                    

QTc:          406                                    

Axis:                                                

P:            -15                                    

WV:           186                                    

QRS:          73                                     

T:            75                                     

                                                     

INTERPRETIVE STATEMENTS:                                       

                                                     

Normal sinus rhythm

Normal ECG

Compared to ECG 08/23/2014 05:36:32

Sinus bradycardia no longer present



Electronically Signed On 11-21-19 06:12:40 CST by Michael Wright